# Patient Record
Sex: FEMALE | Race: WHITE | NOT HISPANIC OR LATINO | Employment: FULL TIME | RURAL
[De-identification: names, ages, dates, MRNs, and addresses within clinical notes are randomized per-mention and may not be internally consistent; named-entity substitution may affect disease eponyms.]

---

## 2020-01-13 ENCOUNTER — OFFICE VISIT (OUTPATIENT)
Dept: NEUROSURGERY | Facility: CLINIC | Age: 45
End: 2020-01-13

## 2020-01-13 DIAGNOSIS — M50.20 HERNIATION OF CERVICAL INTERVERTEBRAL DISC: Primary | ICD-10-CM

## 2020-01-13 DIAGNOSIS — M50.30 DDD (DEGENERATIVE DISC DISEASE), CERVICAL: ICD-10-CM

## 2020-01-13 PROCEDURE — 99243 OFF/OP CNSLTJ NEW/EST LOW 30: CPT | Performed by: NEUROLOGICAL SURGERY

## 2020-01-13 NOTE — PROGRESS NOTES
Subjective   Patient ID: Stephania Hassan is a 44 y.o. 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  She has been treated with chiropractic therapy very successfully in the past month or 2 and symptoms of pain have completely subsided, she has not required a pain pill for about a week.  Is been on a light duty assignment.    Review of Radiographic Studies:  Cervical MRI scan on 10/21/2019 showed a cervical disc herniation at C5-6 on the right.  Chronic degenerative changes with a left-sided mild bulge at C4-5 and C6-7.    The following portions of the patient's history were reviewed, updated as appropriate and approved: allergies, current medications, past family history, past medical history, past social history, past surgical history, review of systems and problem list.  Review of Systems   Constitutional: Negative for activity change, appetite change, chills, diaphoresis, fatigue, fever and unexpected weight change.   HENT: Negative for congestion, dental problem, drooling, ear discharge, ear pain, facial swelling, hearing loss, mouth sores, nosebleeds, postnasal drip, rhinorrhea, sinus pressure, sneezing, sore throat, tinnitus, trouble swallowing and voice change.    Eyes: Negative for photophobia, pain, discharge, redness, itching and visual disturbance.   Respiratory: Negative for apnea, cough, choking, chest tightness, shortness of breath, wheezing and stridor.    Cardiovascular: Negative for chest pain, palpitations and leg swelling.   Gastrointestinal: Negative for abdominal distention, abdominal pain, anal bleeding, blood in stool, constipation, diarrhea, nausea, rectal pain and vomiting.   Endocrine: Negative for cold intolerance, heat intolerance,  polydipsia, polyphagia and polyuria.   Genitourinary: Negative for decreased urine volume, difficulty urinating, dysuria, enuresis, flank pain, frequency, genital sores, hematuria and urgency.   Musculoskeletal: Positive for arthralgias, back pain, myalgias, neck pain and neck stiffness. Negative for gait problem and joint swelling.   Skin: Negative for color change, pallor, rash and wound.   Allergic/Immunologic: Negative for environmental allergies, food allergies and immunocompromised state.   Neurological: Positive for weakness, light-headedness, numbness and headaches. Negative for dizziness, tremors, seizures, syncope, facial asymmetry and speech difficulty.   Hematological: Negative for adenopathy. Does not bruise/bleed easily.   Psychiatric/Behavioral: Negative for agitation, behavioral problems, confusion, decreased concentration, dysphoric mood, hallucinations, self-injury, sleep disturbance and suicidal ideas. The patient is not nervous/anxious and is not hyperactive.        Objective     NEUROLOGICAL EXAMINATION:      MENTAL STATUS:  Alert and oriented.  Speech intact.  Recent and remote memory intact.      CRANIAL NERVES:  Cranial nerve II:  Visual fields are full.  Cranial nerves III, IV and VI:  PERRLADC.  Extraocular movements are intact.  Nystagmus is not present.  Cranial nerve V:  Facial sensation is intact.  Cranial nerve VII:  Muscles of facial expression reveal no asymmetry.  Cranial nerve VIII:  Hearing is intact.  Cranial nerves IX and X:  Palate elevates symmetrically.  Cranial nerve XI:  Shoulder shrug is intact.  Cranial nerve XII:  Tongue is midline without evidence of atrophy or fasciculation.    MUSCULOSKELETAL: Cervical range of motion is now full and intact    MOTOR: No weakness of deltoid, biceps, or triceps on either side    SENSATION: No sensory loss of the arm or hand    REFLEXES:  DTR 2+ biceps and triceps bilaterally    Assessment   Cervical disc herniation C5-6 right.  Symptoms  of radiculopathy have nearly completely subsided.       Plan   Her treatment with chiropractic therapy has been successful.  There is no need for additional treatment at this time.  No surgery is necessary.  She should be able to return to full unrestricted duty about 3 weeks after the time that symptoms fully subside.       Harvinder Lopez MD

## 2024-03-04 ENCOUNTER — OFFICE VISIT (OUTPATIENT)
Dept: ENDOCRINOLOGY | Facility: CLINIC | Age: 49
End: 2024-03-04
Payer: COMMERCIAL

## 2024-03-04 VITALS
OXYGEN SATURATION: 98 % | BODY MASS INDEX: 32.94 KG/M2 | DIASTOLIC BLOOD PRESSURE: 74 MMHG | WEIGHT: 179 LBS | HEIGHT: 62 IN | SYSTOLIC BLOOD PRESSURE: 122 MMHG | HEART RATE: 76 BPM

## 2024-03-04 DIAGNOSIS — R94.6 ABNORMAL THYROID FUNCTION TEST: Primary | ICD-10-CM

## 2024-03-04 DIAGNOSIS — R53.82 CHRONIC FATIGUE: ICD-10-CM

## 2024-03-04 LAB
CORTIS AM PEAK SERPL-MCNC: 8.32 MCG/DL
T3 SERPL-MCNC: 86.4 NG/DL (ref 80–200)
T4 FREE SERPL-MCNC: 1.21 NG/DL (ref 0.93–1.7)
TSH SERPL DL<=0.05 MIU/L-ACNC: 1.23 UIU/ML (ref 0.27–4.2)

## 2024-03-04 PROCEDURE — 84480 ASSAY TRIIODOTHYRONINE (T3): CPT | Performed by: PHYSICIAN ASSISTANT

## 2024-03-04 PROCEDURE — 84439 ASSAY OF FREE THYROXINE: CPT | Performed by: PHYSICIAN ASSISTANT

## 2024-03-04 PROCEDURE — 86800 THYROGLOBULIN ANTIBODY: CPT | Performed by: PHYSICIAN ASSISTANT

## 2024-03-04 PROCEDURE — 84443 ASSAY THYROID STIM HORMONE: CPT | Performed by: PHYSICIAN ASSISTANT

## 2024-03-04 PROCEDURE — 82533 TOTAL CORTISOL: CPT | Performed by: PHYSICIAN ASSISTANT

## 2024-03-04 PROCEDURE — 99204 OFFICE O/P NEW MOD 45 MIN: CPT | Performed by: PHYSICIAN ASSISTANT

## 2024-03-04 PROCEDURE — 82024 ASSAY OF ACTH: CPT | Performed by: PHYSICIAN ASSISTANT

## 2024-03-04 PROCEDURE — 86376 MICROSOMAL ANTIBODY EACH: CPT | Performed by: PHYSICIAN ASSISTANT

## 2024-03-04 RX ORDER — CYANOCOBALAMIN (VITAMIN B-12) 1000 MCG
1 TABLET ORAL DAILY
COMMUNITY

## 2024-03-04 RX ORDER — DIAZEPAM 2 MG/1
2 TABLET ORAL EVERY 12 HOURS PRN
COMMUNITY
Start: 2022-12-09

## 2024-03-04 RX ORDER — FOLIC ACID 1 MG/1
1 TABLET ORAL DAILY
COMMUNITY
Start: 2024-02-02

## 2024-03-04 RX ORDER — METHOTREXATE 2.5 MG/1
TABLET ORAL
COMMUNITY
Start: 2024-01-02

## 2024-03-04 RX ORDER — PANTOPRAZOLE SODIUM 40 MG
40 TABLET, DELAYED RELEASE (ENTERIC COATED) ORAL DAILY
COMMUNITY
Start: 2023-11-17

## 2024-03-04 NOTE — LETTER
March 4, 2024     SHASTA Monge  [address]  [address]  [place] [address]    Patient: Stephania Hassan   YOB: 1975   Date of Visit: 3/4/2024     Dear SHASTA Monge:       Thank you for referring Stephania Hassan to me for evaluation. Below are the relevant portions of my assessment and plan of care.    If you have questions, please do not hesitate to call me. I look forward to following Stephania along with you.         Sincerely,        Rosalino Irwin PA-C        CC: No Recipients    Rosalino Irwin PA-C  03/04/24 0852  Sign when Signing Visit      Chief Complaint:  Stephania Hassan is a 48 y.o. female. Is being seen today for fatigue, hair loss, and elevated T3. Referred by SHASTA Monge.     HPI  48 y.o. female with RA and hx of gastric sleeve surgery 2015, presents for consultation regarding elevated T3, hair loss, and fatigue.     She reports chronic hair loss for about 4-5 years. She has chronic fatigue. Does not sleep well. Tosses and turns all night. Also feels like her mind does not stop.   She had gastric sleeve 9 years ago. Lost 80 pounds. Has gained 15 back.   She does not have constipation, dry skin.   She has swelling in hands and feet and joint pain.   Sometimes has trouble swallowing and occasional vomiting. Though to be related to hx of bariatric sleeve.   She has hx of tachycardia as well as bradycardia. Has cardizem to use prn for tachycardia.     She reports vitamin d, b12, and iron have been checked.     No hx of head or neck radiation.   No fam history of thyroid cancer.     Daughter has hypothyroidism and was diagnosed in childhood. Mom had hypothyroidism.       The following portions of the patient's history were reviewed and updated as appropriate: allergies, current medications, past family history, past medical history, past social history, past surgical history and problem list.    Allergies   Allergen Reactions   • Imitrex [Sumatriptan] Other (See  "Comments)     Previous reaction       Current Outpatient Medications on File Prior to Visit   Medication Sig Dispense Refill   • Cyanocobalamin (B-12) 1000 MCG tablet Take 1 tablet by mouth Daily.     • diazePAM (VALIUM) 2 MG tablet Take 1 tablet by mouth Every 12 (Twelve) Hours As Needed.     • folic acid (FOLVITE) 1 MG tablet Take 1 tablet by mouth Daily.     • methotrexate 2.5 MG tablet Take 6 pills once weekly     • Protonix 40 MG EC tablet Take 1 tablet by mouth Daily.       No current facility-administered medications on file prior to visit.       Past Medical History:   Diagnosis Date   • Arthritis    • Headache    • Polycystic ovary syndrome 1990   • Vitamin D deficiency 2015       Past Surgical History:   Procedure Laterality Date   • GASTRIC RESTRICTION SURGERY  [id number]    Gastric sleeve   • GASTRIC SLEEVE LAPAROSCOPIC     • TUBAL ABDOMINAL LIGATION         Review of Systems  Review of Systems   Constitutional:  Positive for fatigue.   HENT:  Positive for trouble swallowing.    Gastrointestinal:  Positive for vomiting.   Musculoskeletal:  Positive for arthralgias and joint swelling.   Skin:         Hair loss   All other systems reviewed and are negative.      Physical Exam   Objective  Blood pressure 122/74, pulse 76, height 157.5 cm (62\"), weight 81.2 kg (179 lb), SpO2 98%.  Body mass index is 32.74 kg/m².    Physical Exam  Constitutional:       General: She is not in acute distress.     Appearance: She is well-developed. She is not diaphoretic.   Eyes:      General: Lids are normal.   Neck:      Thyroid: No thyroid mass or thyromegaly.      Vascular: No JVD.      Trachea: No tracheal deviation.   Cardiovascular:      Rate and Rhythm: Normal rate and regular rhythm.      Heart sounds: Normal heart sounds. No murmur heard.  Pulmonary:      Effort: Pulmonary effort is normal. No respiratory distress.      Breath sounds: Normal breath sounds. No wheezing.   Musculoskeletal:         General: No tenderness. "   Lymphadenopathy:      Cervical: No cervical adenopathy.   Skin:     General: Skin is warm and dry.      Findings: No erythema or rash.   Neurological:      Mental Status: She is alert and oriented to person, place, and time.   Psychiatric:         Speech: Speech normal.         Behavior: Behavior normal.         Thought Content: Thought content normal.             External labs from 2/23/2023 reviewed: Total T31.7 (range 0.8-1.6), vitamin D42.8, ferritin 95, B12 300, A1c 5    Assessment / Plan    Diagnoses and all orders for this visit:    1. Abnormal thyroid function test (Primary)  -     TSH  -     T4, Free  -     T3  -     Thyroid Antibodies    2. Chronic fatigue  -     TSH  -     T4, Free  -     T3  -     Thyroid Antibodies  -     Cortisol - AM  -     ACTH        Assessment & Plan    Abnormal Thyroid Function Test  -minimal elevation of T3 (no TSH and FT4 available for review)  -check TSH and FT4   -check thyroid antibodies  -discussed if TSH and FT4 are normal the elevated T3 would be incongruent with that picture    Chronic fatigue  -likely multifactorial  -poor sleep definitely contributing  -check TFTs and thyroid antibodies  -check AM cortisol though low suspicion for adrenal insufficiency    F/u will be scheduled, if needed, after labs are reviewed    Follow Up: No follow-ups on file.    Patient Instructions:   There are no Patient Instructions on file for this visit.      Rosalino Irwin PA-C

## 2024-03-04 NOTE — PROGRESS NOTES
Chief Complaint:  Stephania Hassan is a 48 y.o. female. Is being seen today for fatigue, hair loss, and elevated T3. Referred by SHASTA Monge.     HPI  48 y.o. female with RA and hx of gastric sleeve surgery 2015, presents for consultation regarding elevated T3, hair loss, and fatigue.     She reports chronic hair loss for about 4-5 years. She has chronic fatigue. Does not sleep well. Tosses and turns all night. Also feels like her mind does not stop.   She had gastric sleeve 9 years ago. Lost 80 pounds. Has gained 15 back.   She does not have constipation, dry skin.   She has swelling in hands and feet and joint pain.   Sometimes has trouble swallowing and occasional vomiting. Though to be related to hx of bariatric sleeve.   She has hx of tachycardia as well as bradycardia. Has cardizem to use prn for tachycardia.     She reports vitamin d, b12, and iron have been checked.     No hx of head or neck radiation.   No fam history of thyroid cancer.     Daughter has hypothyroidism and was diagnosed in childhood. Mom had hypothyroidism.       The following portions of the patient's history were reviewed and updated as appropriate: allergies, current medications, past family history, past medical history, past social history, past surgical history and problem list.    Allergies   Allergen Reactions    Imitrex [Sumatriptan] Other (See Comments)     Previous reaction       Current Outpatient Medications on File Prior to Visit   Medication Sig Dispense Refill    Cyanocobalamin (B-12) 1000 MCG tablet Take 1 tablet by mouth Daily.      diazePAM (VALIUM) 2 MG tablet Take 1 tablet by mouth Every 12 (Twelve) Hours As Needed.      folic acid (FOLVITE) 1 MG tablet Take 1 tablet by mouth Daily.      methotrexate 2.5 MG tablet Take 6 pills once weekly      Protonix 40 MG EC tablet Take 1 tablet by mouth Daily.       No current facility-administered medications on file prior to visit.       Past Medical History:   Diagnosis  "Date    Arthritis     Headache     Polycystic ovary syndrome 1990    Vitamin D deficiency 2015       Past Surgical History:   Procedure Laterality Date    GASTRIC RESTRICTION SURGERY  [id number]    Gastric sleeve    GASTRIC SLEEVE LAPAROSCOPIC      TUBAL ABDOMINAL LIGATION         Review of Systems  Review of Systems   Constitutional:  Positive for fatigue.   HENT:  Positive for trouble swallowing.    Gastrointestinal:  Positive for vomiting.   Musculoskeletal:  Positive for arthralgias and joint swelling.   Skin:         Hair loss   All other systems reviewed and are negative.      Physical Exam   Objective   Blood pressure 122/74, pulse 76, height 157.5 cm (62\"), weight 81.2 kg (179 lb), SpO2 98%.  Body mass index is 32.74 kg/m².    Physical Exam  Constitutional:       General: She is not in acute distress.     Appearance: She is well-developed. She is not diaphoretic.   Eyes:      General: Lids are normal.   Neck:      Thyroid: No thyroid mass or thyromegaly.      Vascular: No JVD.      Trachea: No tracheal deviation.   Cardiovascular:      Rate and Rhythm: Normal rate and regular rhythm.      Heart sounds: Normal heart sounds. No murmur heard.  Pulmonary:      Effort: Pulmonary effort is normal. No respiratory distress.      Breath sounds: Normal breath sounds. No wheezing.   Musculoskeletal:         General: No tenderness.   Lymphadenopathy:      Cervical: No cervical adenopathy.   Skin:     General: Skin is warm and dry.      Findings: No erythema or rash.   Neurological:      Mental Status: She is alert and oriented to person, place, and time.   Psychiatric:         Speech: Speech normal.         Behavior: Behavior normal.         Thought Content: Thought content normal.             External labs from 2/23/2023 reviewed: Total T31.7 (range 0.8-1.6), vitamin D42.8, ferritin 95, B12 300, A1c 5    Assessment / Plan    Diagnoses and all orders for this visit:    1. Abnormal thyroid function test (Primary)  -     " TSH  -     T4, Free  -     T3  -     Thyroid Antibodies    2. Chronic fatigue  -     TSH  -     T4, Free  -     T3  -     Thyroid Antibodies  -     Cortisol - AM  -     ACTH        Assessment & Plan     Abnormal Thyroid Function Test  -minimal elevation of T3 (no TSH and FT4 available for review)  -check TSH and FT4   -check thyroid antibodies  -discussed if TSH and FT4 are normal the elevated T3 would be incongruent with that picture    Chronic fatigue  -likely multifactorial  -poor sleep definitely contributing  -check TFTs and thyroid antibodies  -check AM cortisol though low suspicion for adrenal insufficiency    F/u will be scheduled, if needed, after labs are reviewed    Follow Up: No follow-ups on file.    Patient Instructions:   There are no Patient Instructions on file for this visit.      Rosalino Irwin PA-C

## 2024-03-05 LAB
ACTH PLAS-MCNC: 17.4 PG/ML (ref 7.2–63.3)
THYROGLOB AB SERPL-ACNC: <1 IU/ML (ref 0–0.9)
THYROPEROXIDASE AB SERPL-ACNC: 11 IU/ML (ref 0–34)